# Patient Record
Sex: MALE | Race: BLACK OR AFRICAN AMERICAN | NOT HISPANIC OR LATINO | URBAN - METROPOLITAN AREA
[De-identification: names, ages, dates, MRNs, and addresses within clinical notes are randomized per-mention and may not be internally consistent; named-entity substitution may affect disease eponyms.]

---

## 2017-01-04 ENCOUNTER — OUTPATIENT (OUTPATIENT)
Dept: OUTPATIENT SERVICES | Facility: HOSPITAL | Age: 32
LOS: 1 days | Discharge: HOME | End: 2017-01-04

## 2017-06-27 DIAGNOSIS — K02.53 DENTAL CARIES ON PIT AND FISSURE SURFACE PENETRATING INTO PULP: ICD-10-CM

## 2025-06-24 ENCOUNTER — EMERGENCY (EMERGENCY)
Facility: HOSPITAL | Age: 40
LOS: 0 days | Discharge: HOME | End: 2025-06-25
Attending: EMERGENCY MEDICINE
Payer: MEDICAID

## 2025-06-24 VITALS
HEART RATE: 75 BPM | WEIGHT: 210.1 LBS | RESPIRATION RATE: 20 BRPM | OXYGEN SATURATION: 98 % | DIASTOLIC BLOOD PRESSURE: 99 MMHG | TEMPERATURE: 98 F | SYSTOLIC BLOOD PRESSURE: 144 MMHG | HEIGHT: 65 IN

## 2025-06-24 VITALS
OXYGEN SATURATION: 100 % | SYSTOLIC BLOOD PRESSURE: 137 MMHG | DIASTOLIC BLOOD PRESSURE: 74 MMHG | RESPIRATION RATE: 18 BRPM | HEART RATE: 80 BPM | TEMPERATURE: 98 F

## 2025-06-24 DIAGNOSIS — R42 DIZZINESS AND GIDDINESS: ICD-10-CM

## 2025-06-24 DIAGNOSIS — H81.21 VESTIBULAR NEURONITIS, RIGHT EAR: ICD-10-CM

## 2025-06-24 DIAGNOSIS — E11.65 TYPE 2 DIABETES MELLITUS WITH HYPERGLYCEMIA: ICD-10-CM

## 2025-06-24 DIAGNOSIS — R11.2 NAUSEA WITH VOMITING, UNSPECIFIED: ICD-10-CM

## 2025-06-24 DIAGNOSIS — H53.2 DIPLOPIA: ICD-10-CM

## 2025-06-24 DIAGNOSIS — T38.3X6A UNDERDOSING OF INSULIN AND ORAL HYPOGLYCEMIC [ANTIDIABETIC] DRUGS, INITIAL ENCOUNTER: ICD-10-CM

## 2025-06-24 DIAGNOSIS — H55.09 OTHER FORMS OF NYSTAGMUS: ICD-10-CM

## 2025-06-24 LAB
ALBUMIN SERPL ELPH-MCNC: 4.2 G/DL — SIGNIFICANT CHANGE UP (ref 3.5–5.2)
ALP SERPL-CCNC: 92 U/L — SIGNIFICANT CHANGE UP (ref 30–115)
ALT FLD-CCNC: 18 U/L — SIGNIFICANT CHANGE UP (ref 0–41)
ANION GAP SERPL CALC-SCNC: 11 MMOL/L — SIGNIFICANT CHANGE UP (ref 7–14)
AST SERPL-CCNC: 14 U/L — SIGNIFICANT CHANGE UP (ref 0–41)
BASOPHILS # BLD AUTO: 0.03 K/UL — SIGNIFICANT CHANGE UP (ref 0–0.2)
BASOPHILS NFR BLD AUTO: 0.4 % — SIGNIFICANT CHANGE UP (ref 0–1)
BILIRUB SERPL-MCNC: 0.4 MG/DL — SIGNIFICANT CHANGE UP (ref 0.2–1.2)
BUN SERPL-MCNC: 12 MG/DL — SIGNIFICANT CHANGE UP (ref 10–20)
CALCIUM SERPL-MCNC: 9.2 MG/DL — SIGNIFICANT CHANGE UP (ref 8.4–10.5)
CHLORIDE SERPL-SCNC: 101 MMOL/L — SIGNIFICANT CHANGE UP (ref 98–110)
CO2 SERPL-SCNC: 24 MMOL/L — SIGNIFICANT CHANGE UP (ref 17–32)
CREAT SERPL-MCNC: 0.7 MG/DL — SIGNIFICANT CHANGE UP (ref 0.7–1.5)
EGFR: 119 ML/MIN/1.73M2 — SIGNIFICANT CHANGE UP
EGFR: 119 ML/MIN/1.73M2 — SIGNIFICANT CHANGE UP
EOSINOPHIL # BLD AUTO: 0.09 K/UL — SIGNIFICANT CHANGE UP (ref 0–0.7)
EOSINOPHIL NFR BLD AUTO: 1.3 % — SIGNIFICANT CHANGE UP (ref 0–8)
GAS PNL BLDV: SIGNIFICANT CHANGE UP
GLUCOSE SERPL-MCNC: 345 MG/DL — HIGH (ref 70–99)
HCT VFR BLD CALC: 41.7 % — LOW (ref 42–52)
HGB BLD-MCNC: 14.2 G/DL — SIGNIFICANT CHANGE UP (ref 14–18)
IMM GRANULOCYTES NFR BLD AUTO: 1 % — HIGH (ref 0.1–0.3)
LACTATE SERPL-SCNC: 2.5 MMOL/L — HIGH (ref 0.7–2)
LYMPHOCYTES # BLD AUTO: 2.81 K/UL — SIGNIFICANT CHANGE UP (ref 1.2–3.4)
LYMPHOCYTES # BLD AUTO: 40.1 % — SIGNIFICANT CHANGE UP (ref 20.5–51.1)
MAGNESIUM SERPL-MCNC: 2 MG/DL — SIGNIFICANT CHANGE UP (ref 1.8–2.4)
MCHC RBC-ENTMCNC: 28 PG — SIGNIFICANT CHANGE UP (ref 27–31)
MCHC RBC-ENTMCNC: 34.1 G/DL — SIGNIFICANT CHANGE UP (ref 32–37)
MCV RBC AUTO: 82.2 FL — SIGNIFICANT CHANGE UP (ref 80–94)
MONOCYTES # BLD AUTO: 0.5 K/UL — SIGNIFICANT CHANGE UP (ref 0.1–0.6)
MONOCYTES NFR BLD AUTO: 7.1 % — SIGNIFICANT CHANGE UP (ref 1.7–9.3)
NEUTROPHILS # BLD AUTO: 3.5 K/UL — SIGNIFICANT CHANGE UP (ref 1.4–6.5)
NEUTROPHILS NFR BLD AUTO: 50.1 % — SIGNIFICANT CHANGE UP (ref 42.2–75.2)
NRBC BLD AUTO-RTO: 0 /100 WBCS — SIGNIFICANT CHANGE UP (ref 0–0)
PLATELET # BLD AUTO: 265 K/UL — SIGNIFICANT CHANGE UP (ref 130–400)
PMV BLD: 10.7 FL — HIGH (ref 7.4–10.4)
POTASSIUM SERPL-MCNC: 4.2 MMOL/L — SIGNIFICANT CHANGE UP (ref 3.5–5)
POTASSIUM SERPL-SCNC: 4.2 MMOL/L — SIGNIFICANT CHANGE UP (ref 3.5–5)
PROT SERPL-MCNC: 6.4 G/DL — SIGNIFICANT CHANGE UP (ref 6–8)
RBC # BLD: 5.07 M/UL — SIGNIFICANT CHANGE UP (ref 4.7–6.1)
RBC # FLD: 12.1 % — SIGNIFICANT CHANGE UP (ref 11.5–14.5)
SODIUM SERPL-SCNC: 136 MMOL/L — SIGNIFICANT CHANGE UP (ref 135–146)
TROPONIN T, HIGH SENSITIVITY RESULT: 7 NG/L — SIGNIFICANT CHANGE UP (ref 6–21)
TROPONIN T, HIGH SENSITIVITY RESULT: <6 NG/L — SIGNIFICANT CHANGE UP (ref 6–21)
WBC # BLD: 7 K/UL — SIGNIFICANT CHANGE UP (ref 4.8–10.8)
WBC # FLD AUTO: 7 K/UL — SIGNIFICANT CHANGE UP (ref 4.8–10.8)

## 2025-06-24 PROCEDURE — 93010 ELECTROCARDIOGRAM REPORT: CPT

## 2025-06-24 PROCEDURE — 85018 HEMOGLOBIN: CPT

## 2025-06-24 PROCEDURE — 99285 EMERGENCY DEPT VISIT HI MDM: CPT | Mod: 25

## 2025-06-24 PROCEDURE — 82962 GLUCOSE BLOOD TEST: CPT

## 2025-06-24 PROCEDURE — 84295 ASSAY OF SERUM SODIUM: CPT

## 2025-06-24 PROCEDURE — 82330 ASSAY OF CALCIUM: CPT

## 2025-06-24 PROCEDURE — 85025 COMPLETE CBC W/AUTO DIFF WBC: CPT

## 2025-06-24 PROCEDURE — 82803 BLOOD GASES ANY COMBINATION: CPT

## 2025-06-24 PROCEDURE — 70553 MRI BRAIN STEM W/O & W/DYE: CPT

## 2025-06-24 PROCEDURE — 70553 MRI BRAIN STEM W/O & W/DYE: CPT | Mod: 26

## 2025-06-24 PROCEDURE — A9579: CPT

## 2025-06-24 PROCEDURE — 36415 COLL VENOUS BLD VENIPUNCTURE: CPT

## 2025-06-24 PROCEDURE — 70450 CT HEAD/BRAIN W/O DYE: CPT | Mod: 26

## 2025-06-24 PROCEDURE — 83605 ASSAY OF LACTIC ACID: CPT

## 2025-06-24 PROCEDURE — 93005 ELECTROCARDIOGRAM TRACING: CPT

## 2025-06-24 PROCEDURE — 84484 ASSAY OF TROPONIN QUANT: CPT

## 2025-06-24 PROCEDURE — 80053 COMPREHEN METABOLIC PANEL: CPT

## 2025-06-24 PROCEDURE — 85014 HEMATOCRIT: CPT

## 2025-06-24 PROCEDURE — G0378: CPT

## 2025-06-24 PROCEDURE — 70450 CT HEAD/BRAIN W/O DYE: CPT

## 2025-06-24 PROCEDURE — 96374 THER/PROPH/DIAG INJ IV PUSH: CPT | Mod: XU

## 2025-06-24 PROCEDURE — 84132 ASSAY OF SERUM POTASSIUM: CPT

## 2025-06-24 PROCEDURE — 99223 1ST HOSP IP/OBS HIGH 75: CPT

## 2025-06-24 PROCEDURE — 83735 ASSAY OF MAGNESIUM: CPT

## 2025-06-24 RX ORDER — MECLIZINE HCL 12.5 MG
50 TABLET ORAL ONCE
Refills: 0 | Status: COMPLETED | OUTPATIENT
Start: 2025-06-24 | End: 2025-06-24

## 2025-06-24 RX ORDER — METOCLOPRAMIDE HCL 10 MG
10 TABLET ORAL ONCE
Refills: 0 | Status: COMPLETED | OUTPATIENT
Start: 2025-06-24 | End: 2025-06-24

## 2025-06-24 RX ORDER — SODIUM CHLORIDE 9 G/1000ML
1000 INJECTION, SOLUTION INTRAVENOUS ONCE
Refills: 0 | Status: COMPLETED | OUTPATIENT
Start: 2025-06-24 | End: 2025-06-24

## 2025-06-24 RX ORDER — DIAZEPAM 5 MG/1
5 TABLET ORAL ONCE
Refills: 0 | Status: DISCONTINUED | OUTPATIENT
Start: 2025-06-24 | End: 2025-06-24

## 2025-06-24 RX ORDER — SODIUM CHLORIDE 9 G/1000ML
500 INJECTION, SOLUTION INTRAVENOUS ONCE
Refills: 0 | Status: COMPLETED | OUTPATIENT
Start: 2025-06-24 | End: 2025-06-24

## 2025-06-24 RX ORDER — METOPROLOL SUCCINATE 50 MG/1
5 TABLET, EXTENDED RELEASE ORAL ONCE
Refills: 0 | Status: DISCONTINUED | OUTPATIENT
Start: 2025-06-24 | End: 2025-06-24

## 2025-06-24 RX ADMIN — Medication 104 MILLIGRAM(S): at 17:31

## 2025-06-24 RX ADMIN — SODIUM CHLORIDE 1000 MILLILITER(S): 9 INJECTION, SOLUTION INTRAVENOUS at 15:14

## 2025-06-24 RX ADMIN — Medication 50 MILLIGRAM(S): at 11:59

## 2025-06-24 RX ADMIN — SODIUM CHLORIDE 1000 MILLILITER(S): 9 INJECTION, SOLUTION INTRAVENOUS at 11:59

## 2025-06-24 RX ADMIN — SODIUM CHLORIDE 500 MILLILITER(S): 9 INJECTION, SOLUTION INTRAVENOUS at 16:19

## 2025-06-24 RX ADMIN — DIAZEPAM 5 MILLIGRAM(S): 5 TABLET ORAL at 17:04

## 2025-06-24 NOTE — ED PROVIDER NOTE - PHYSICAL EXAMINATION
As Follows:  CONST: Well appearing in NAD  EYES: PERRL, EOMI, Sclera and conjunctiva clear. Bilateral nystagmus.   ENT: No nasal discharge. Oropharynx normal appearing, no erythema / exudates. Uvula midline. Airway intact.   CARD: No murmurs, rubs, or gallops; Normal rate and rhythm  RESP: BS Equal B/L, No wheezes, rhonchi or rales. No distress or accessory breathing  GI: Soft, non-tender, non-distended.  SKIN: Warm, dry, no acute rashes. MMM  NEURO: Alert and Oriented, No focal deficits. Strength and sensation intact. Steady gait, subjective dizziness. No pronator drift. Finger to nose intact.

## 2025-06-24 NOTE — ED PROVIDER NOTE - OBJECTIVE STATEMENT
Patient is a 39-year-old male with past medical history of diabetes noncompliant with medication presents for evaluation of dizziness with multiple episodes of nausea and vomiting since 7:15 AM today.  He denies any fever, cough, sore throat, chills, chest pain, shortness of breath, abdominal pain, urinary complaints.  He has never had the symptoms before. Patient is a 39-year-old male with past medical history of diabetes noncompliant with medication presents for evaluation of dizziness with multiple episodes of nausea and vomiting since 4 AM today.  He denies any fever, cough, sore throat, chills, chest pain, shortness of breath, abdominal pain, urinary complaints.  He has never had the symptoms before.

## 2025-06-24 NOTE — ED CDU PROVIDER INITIAL DAY NOTE - ATTENDING APP SHARED VISIT CONTRIBUTION OF CARE
47-year-old male past medical history diabetes noncompliant with any diabetes medication presents with nausea vomiting and dizziness.  Symptoms started this morning at around 4 AM when he woke up.  No fevers no chills.  No chest pain or shortness breath.  No abdominal pain.  No back pain.  No numbness no weakness.  No other complaints.  Awake alert.  Extract movements intact.  Normal finger-nose.  Motor sensation intact in all 4 extremities.  Neck supple.  Abdomen soft nontender.  Patient following commands.  Patient nontoxic well-appearing.  Symptoms started at least over 6 hours ago.  Labs and imaging reviewed.  Pt with hyperglycemia.  Pt not in DKA.  Pt remained symptomatic in the ED after treatment.  Neuro consulted because of symptoms as well as CT results.  Pt placed in Obs for further management and evaluation including MRI. 39-year-old male past medical history diabetes noncompliant with any diabetes medication presents with nausea vomiting and dizziness.  Symptoms started this morning at around 4 AM when he woke up.  No fevers no chills.  No chest pain or shortness breath.  No abdominal pain.  No back pain.  No numbness no weakness.  No other complaints.  Awake alert.  Extract movements intact.  Normal finger-nose.  Motor sensation intact in all 4 extremities.  Neck supple.  Abdomen soft nontender.  Patient following commands.  Patient nontoxic well-appearing.  Symptoms started at least over 6 hours ago.  Labs and imaging reviewed.  Pt with hyperglycemia.  Pt not in DKA.  Pt remained symptomatic in the ED after treatment.  Neuro consulted because of symptoms as well as CT results.  Pt placed in Obs for further management and evaluation including MRI. 39-year-old male past medical history diabetes noncompliant with any diabetes medication presents with nausea vomiting and dizziness.  Symptoms started this morning at around 4 AM when he woke up.  No fevers no chills.  No chest pain or shortness breath.  No abdominal pain.  No back pain.  No numbness no weakness.  No other complaints.  Awake alert.  Extraocular movements intact.  Normal finger-nose.  Motor sensation intact in all 4 extremities.  Neck supple.  Abdomen soft nontender.  Patient following commands.  Patient nontoxic well-appearing.  Symptoms started at least over 6 hours ago.  Labs and imaging reviewed.  Pt with hyperglycemia.  Pt not in DKA.  Pt remained symptomatic in the ED after treatment.  Neuro consulted because of symptoms as well as CT results.  Pt placed in Obs for further management and evaluation including MRI.

## 2025-06-24 NOTE — ED ADULT TRIAGE NOTE - BSA (M2)
Alert and oriented to person, place and time, memory intact, behavior appropriate to situation, PERRL. 2.02

## 2025-06-24 NOTE — CONSULT NOTE ADULT - ASSESSMENT
39Y M w. PMHx DM uncontrolled who presented to the ED c/o dizziness, generalized weakness and vomiting since this morning pending further workup. Given unidirectional nystagmus and corrective saccades on impulse test correlates with peripheral etiology of dizziness. Pt with R. sided positive Vaughn-Hallpike maneuver consistent with BPPV.    Neuro Recs:  - Epley maneuver  - Trial valium 5mg, if Sx persist observation for MRI Brain non-con and meclizine 50mg Q8h  39Y M w. PMHx DM uncontrolled who presented to the ED c/o dizziness, generalized weakness and vomiting since this morning pending further workup. Given unidirectional nystagmus and corrective saccades on impulse test correlates with peripheral etiology of dizziness. Pt with R. sided positive Parnell-Hallpike maneuver consistent with BPPV.    Neuro Recs:  - Epley maneuver  - Trial valium 5mg, if Sx persist observation for MRI Brain non-con and meclizine 50mg Q8h     Case discussed with Dr. Shea

## 2025-06-24 NOTE — ED PROVIDER NOTE - ATTENDING APP SHARED VISIT CONTRIBUTION OF CARE
47-year-old male past medical history diabetes noncompliant with any diabetes medication presents with nausea vomiting and dizziness.  Symptoms started this morning at around 4 AM when he woke up.  No fevers no chills.  No chest pain or shortness breath.  No abdominal pain.  No back pain.  No numbness no weakness.  No other complaints.  Awake alert.  Extract movements intact.  Normal finger-nose.  Motor sensation intact in all 4 extremities.  Neck supple.  Abdomen soft nontender.  Patient following commands.  Patient nontoxic well-appearing.  Plan: Labs, EKG, CT, supportive care, reassess.  Symptoms started at least over 6 hours ago. 39-year-old male past medical history diabetes noncompliant with any diabetes medication presents with nausea vomiting and dizziness.  Symptoms started this morning at around 4 AM when he woke up.  No fevers no chills.  No chest pain or shortness breath.  No abdominal pain.  No back pain.  No numbness no weakness.  No other complaints.  Awake alert.  Extract movements intact.  Normal finger-nose.  Motor sensation intact in all 4 extremities.  Neck supple.  Abdomen soft nontender.  Patient following commands.  Patient nontoxic well-appearing.  Plan: Labs, EKG, CT, supportive care, reassess.  Symptoms started at least over 6 hours ago. 39-year-old male past medical history diabetes noncompliant with any diabetes medication presents with nausea vomiting and dizziness.  Symptoms started this morning at around 4 AM when he woke up.  No fevers no chills.  No chest pain or shortness breath.  No abdominal pain.  No back pain.  No numbness no weakness.  No other complaints.  Awake alert.  Extraocular movements intact.  Normal finger-nose.  Motor sensation intact in all 4 extremities.  Neck supple.  Abdomen soft nontender.  Patient following commands.  Patient nontoxic well-appearing.  Plan: Labs, EKG, CT, supportive care, reassess.  Symptoms started at least over 6 hours ago.

## 2025-06-24 NOTE — CONSULT NOTE ADULT - SUBJECTIVE AND OBJECTIVE BOX
NEUROLOGY CONSULT    HPI:     MEDICATIONS  Home Medications:    MEDICATIONS  (STANDING):  lactated ringers Bolus 1000 milliLiter(s) IV Bolus once    MEDICATIONS  (PRN):      FAMILY HISTORY:    SOCIAL HISTORY: negative for tobacco, alcohol, or ilicit drug use.    Allergies    No Known Allergies    Intolerances        Neurological Examination:  General:  Appearance is consistent with chronologic age.   Cognitive/Language:  Awake, alert, and oriented to person, place, time and date.  Recent and remote memory intact.  Fund of knowledge is appropriate.  Naming, repetition and comprehension intact. Nondysarthric.    Cranial Nerves  - Eyes: Visual fields full.  EOMI w/o nystagmus, skew or reported double vision.  PERRL.  No ptosis/weakness of eyelid closure.    - Face:  Facial sensation normal V1 - 3, no facial asymmetry.    - Ears/Nose/Throat:  Hearing grossly intact b/l to finger rub.  Palate elevates midline.  Tongue and uvula midline.   Motor exam: Normal tone and bulk. No tenderness, twitching, tremors or involuntary movements.            Upper extremity                  Bicep     Tricep     HG                                                 R      5/5        5/5          5/5                                                    L       5/5        5/5          5/5              Lower extremity                   HF        KE        DF         PF                                                  R     5/5       5/5       5/5       5/5                                               L      5/5      5/5       5/5        5/5    Sensory examination:  Intact to light touch and pinprick, pain, temperature and proprioception and vibration in all extremities.  Reflexes: 2+ b/l biceps, triceps, patella and achilles.  Plantar response downgoing b/l.  Radha, clonus absent.  Cerebellum: FTN/HKS intact.  No dysmetria.    Gait narrow based and normal.    LABS:                        14.2   7.00  )-----------( 265      ( 24 Jun 2025 12:10 )             41.7     06-24    136  |  101  |  12  ----------------------------<  345[H]  4.2   |  24  |  0.7    Ca    9.2      24 Jun 2025 12:10  Mg     2.0     06-24    TPro  6.4  /  Alb  4.2  /  TBili  0.4  /  DBili  x   /  AST  14  /  ALT  18  /  AlkPhos  92  06-24    Hemoglobin A1C:   Vitamin B12     CAPILLARY BLOOD GLUCOSE      POCT Blood Glucose.: 371 mg/dL (24 Jun 2025 11:03)      Urinalysis Basic - ( 24 Jun 2025 12:10 )    Color: x / Appearance: x / SG: x / pH: x  Gluc: 345 mg/dL / Ketone: x  / Bili: x / Urobili: x   Blood: x / Protein: x / Nitrite: x   Leuk Esterase: x / RBC: x / WBC x   Sq Epi: x / Non Sq Epi: x / Bacteria: x            Microbiology:      RADIOLOGY, EKG AND ADDITIONAL TESTS: Reviewed.           NEUROLOGY CONSULT    HPI:  39Y M w. PMHx DM uncontrolled who presented to the ED c/o dizziness, generalized weakness and vomiting since this morning. Pt says he woke up 0400 with nausea and dizziness which he describes as the room spinning while he is sitting still. He says around 0800 the Sx calmed down as he traveled to work, and by 1030am he felt worse and had one episode of vomiting as well. Says the episodes are worse when he sits up and laying down helps with the dizziness, although it persists whether he is laying down or not. Denies any other PMHx, previously taking metformin for DM but stopped about a year ago. Denies alcohol use, tobacco use. Endorses marijuana use on occasion. Denies tinnitus, headache, neck pain, numbness, tingling or weakness. Endorses dizziness, nausea and vomiting.     MEDICATIONS  Home Medications:    MEDICATIONS  (STANDING):  lactated ringers Bolus 1000 milliLiter(s) IV Bolus once    MEDICATIONS  (PRN):      FAMILY HISTORY:    SOCIAL HISTORY: negative for tobacco, alcohol, or ilicit drug use.    Allergies    No Known Allergies    Intolerances        Neurological Examination:  General:  Appearance is consistent with chronologic age.   Cognitive/Language:  Awake, alert, and oriented to person, place, time and date.  Recent and remote memory intact.  Fund of knowledge is appropriate. Nondysarthric.    Cranial Nerves  - Eyes: EOMI w/ L beating nystagmus on L lateral and R lateral gaze, no skew. Endorses double vision. PERRL. No ptosis/weakness of eyelid closure.    - Face:  Facial sensation normal V1 - 3, no facial asymmetry.    - Ears/Nose/Throat:  Hearing grossly intact b/l to finger rub.  Palate elevates midline.  Tongue and uvula midline.   Motor exam: Normal tone and bulk. No tenderness, twitching, tremors or involuntary movements.            Upper extremity                  Bicep     Tricep     HG                                                 R      5/5        5/5          5/5                                                    L       5/5        5/5          5/5              Lower extremity                   HF        KE        DF         PF                                                  R     5/5       5/5       5/5       5/5                                               L      5/5      5/5       5/5        5/5    Sensory examination:  Intact to light touch in all extremities  Reflexes: 2+ b/l biceps, triceps, patella and achilles.  Cerebellum: FTN/HKS intact.  No dysmetria.    Gait deferred due to pt being too dizzy to stand  HINTS: Head-impulse test with corrective saccades, + nystagmus, - skew  Natalya-Hallpike: + on R side    LABS:                        14.2   7.00  )-----------( 265      ( 24 Jun 2025 12:10 )             41.7     06-24    136  |  101  |  12  ----------------------------<  345[H]  4.2   |  24  |  0.7    Ca    9.2      24 Jun 2025 12:10  Mg     2.0     06-24    TPro  6.4  /  Alb  4.2  /  TBili  0.4  /  DBili  x   /  AST  14  /  ALT  18  /  AlkPhos  92  06-24    Hemoglobin A1C:   Vitamin B12     CAPILLARY BLOOD GLUCOSE      POCT Blood Glucose.: 371 mg/dL (24 Jun 2025 11:03)      Urinalysis Basic - ( 24 Jun 2025 12:10 )    Color: x / Appearance: x / SG: x / pH: x  Gluc: 345 mg/dL / Ketone: x  / Bili: x / Urobili: x   Blood: x / Protein: x / Nitrite: x   Leuk Esterase: x / RBC: x / WBC x   Sq Epi: x / Non Sq Epi: x / Bacteria: x            Microbiology:      RADIOLOGY, EKG AND ADDITIONAL TESTS: Reviewed.

## 2025-06-24 NOTE — ED ADULT TRIAGE NOTE - CHIEF COMPLAINT QUOTE
C/o dizziness, generalized weakness, and vomiting since this morning. Pt noncompliant with diabetes meds.  in triage

## 2025-06-24 NOTE — ED PROVIDER NOTE - PROGRESS NOTE DETAILS
Neurology consulted.  Recommended Valium as symptoms are consistent with BPPV.  Patient vomited after Valium.  Reglan ordered.  Patient agreeable to stay for MRI.

## 2025-06-25 PROCEDURE — 99239 HOSP IP/OBS DSCHRG MGMT >30: CPT

## 2025-06-25 NOTE — ED CDU PROVIDER DISPOSITION NOTE - NSPTACCESSSVCSAPPTDETAILS_ED_ALL_ED_FT
Please schedule rapid referral for patient with ENT seen in the ED for dizziness likely needs vestibular therapy.    Please schedule rapid referral for patient with neurosurgery, incidental finding of Chiari type I malformation seen on MRI

## 2025-06-25 NOTE — ED CDU PROVIDER SUBSEQUENT DAY NOTE - CLINICAL SUMMARY MEDICAL DECISION MAKING FREE TEXT BOX
Patient placed in obs for dizziness, had unremarkable MRI without evidence of central vertigo. Neuro was consulted, recommend outpatient ENT eval for vestibular therapy. Patient is feeling much better stable for dc home with follow up, sx monitoring and return precautions.

## 2025-06-25 NOTE — ED CDU PROVIDER SUBSEQUENT DAY NOTE - HISTORY
Patient signed out to me pending MRI.  Reassessed patient, reporting improvement of symptoms.  MRI resulted, reconsulted neuro who states patient can follow-up with ENT for vestibular therapy.  Discussed all findings of MRI head with patient who voices understanding.  Will give rapid referral for neurosurgery and vestibular therapy.

## 2025-06-25 NOTE — ED CDU PROVIDER DISPOSITION NOTE - NSFOLLOWUPINSTRUCTIONS_ED_ALL_ED_FT
Follow up with your PMD this week. Take your medications as prescribed.     Our Emergency Department Referral Coordinators will be reaching out to you in the next 24-48 hours from 9:00am to 5:00pm with a follow up appointment. Please expect a phone call from the hospital in that time frame. If you do not receive a call or if you have any questions or concerns, you can reach them at   [phone].     Dizziness    Dizziness is a common problem. It is a feeling of unsteadiness or light-headedness. You may feel like you are about to faint. This condition can be caused by a number of things, including medicines, dehydration, or illness. Drink enough fluid to keep your urine clear or pale yellow. Do not drink alcohol and limit your caffeine and salt intake. Avoid quick movement.  Rise slowly from chairs and steady yourself until you feel okay. In the morning, first sit up on the side of the bed.    SEEK IMMEDIATE MEDICAL CARE IF YOU HAVE THE FOLLOWING SYMPTOMS: vomiting, changes in your vision or speech, weakness in your arms or legs, trouble speaking or swallowing, chest pain, abdominal pain, shortness of breath, sweating, bleeding, headache, neck pain, or fever.

## 2025-06-25 NOTE — ED CDU PROVIDER DISPOSITION NOTE - PATIENT PORTAL LINK FT
You can access the FollowMyHealth Patient Portal offered by [facility] by registering at the following website: [email or web address]. By joining [facility]’s FollowMyHealth portal, you will also be able to view your health information using other applications (apps) compatible with our system.

## 2025-07-03 NOTE — CHART NOTE - NSCHARTNOTEFT_GEN_A_CORE
Left v/mikal, CT 6/25. / Emailed ENT 6/25 - [facility]. Darren sent, CT 7/1. Appt scheduled 07/15/2025 12:00 PM w/ Dr. Saleh @ [address] [place] [address], CT 7/3 (ENT referral).

## 2025-07-15 ENCOUNTER — APPOINTMENT (OUTPATIENT)
Dept: OTOLARYNGOLOGY | Facility: CLINIC | Age: 40
End: 2025-07-15
Payer: MEDICAID

## 2025-07-15 PROBLEM — R42 DIZZINESS: Status: ACTIVE | Noted: 2025-07-15

## 2025-07-15 PROBLEM — G93.5 CHIARI MALFORMATION TYPE I: Status: ACTIVE | Noted: 2025-07-15

## 2025-07-15 PROBLEM — Z00.00 ENCOUNTER FOR PREVENTIVE HEALTH EXAMINATION: Status: ACTIVE | Noted: 2025-07-15

## 2025-07-15 PROCEDURE — 99204 OFFICE O/P NEW MOD 45 MIN: CPT

## 2025-08-07 ENCOUNTER — APPOINTMENT (OUTPATIENT)
Dept: OTOLARYNGOLOGY | Facility: CLINIC | Age: 40
End: 2025-08-07

## 2025-08-26 ENCOUNTER — APPOINTMENT (OUTPATIENT)
Dept: NEUROSURGERY | Facility: CLINIC | Age: 40
End: 2025-08-26